# Patient Record
(demographics unavailable — no encounter records)

---

## 2025-03-14 NOTE — REASON FOR VISIT
[Initial Eval - Existing Diagnosis] : an initial evaluation of an existing diagnosis [Formal Caregiver] : formal caregiver [Other: _____] : [unfilled]